# Patient Record
Sex: MALE | Race: WHITE
[De-identification: names, ages, dates, MRNs, and addresses within clinical notes are randomized per-mention and may not be internally consistent; named-entity substitution may affect disease eponyms.]

---

## 2023-03-22 ENCOUNTER — HOSPITAL ENCOUNTER (OUTPATIENT)
Dept: HOSPITAL 76 - SC | Age: 28
Discharge: HOME | End: 2023-03-22
Attending: NURSE PRACTITIONER
Payer: COMMERCIAL

## 2023-03-22 VITALS — SYSTOLIC BLOOD PRESSURE: 100 MMHG | DIASTOLIC BLOOD PRESSURE: 62 MMHG

## 2023-03-22 DIAGNOSIS — G47.8: ICD-10-CM

## 2023-03-22 DIAGNOSIS — Z87.891: ICD-10-CM

## 2023-03-22 DIAGNOSIS — R06.81: ICD-10-CM

## 2023-03-22 DIAGNOSIS — R06.83: Primary | ICD-10-CM

## 2023-03-22 DIAGNOSIS — E66.9: ICD-10-CM

## 2023-03-22 DIAGNOSIS — R53.83: ICD-10-CM

## 2023-03-22 PROCEDURE — 99203 OFFICE O/P NEW LOW 30 MIN: CPT

## 2023-03-22 PROCEDURE — 99212 OFFICE O/P EST SF 10 MIN: CPT

## 2023-03-22 NOTE — SLEEP CARE CONSULTATION
Information from patient questionnaire entered by Chapis Valenzuela.





I have reviewed and concur with the information entered by Chapis Valenzuela. This 

document represents the service I personally performed and the decisions made by

me, Ellen Contreras ARNP.





History of Present Illness


Service Date and Time: 2023    0845


Reason for Visit: New patient


Chief Complaint: reports: Unrefreshed sleep, Snoring, Observed pauses in 

breathing, Fatigue


Date of Onset: 4YRS


Usual bedtime: 11PM


Time it takes to fall asleep: 30-60MIN


Snores at night: Yes


Observed to quit breathing while asleep: Yes


Sleeps alone due to snoring: No


Number of times waking at night: 1-2


Reasons for waking at night: reports: Bathroom.  denies: Choking, Snoring, 

Gasping for air


Toss, Turn, or Twitch while sleeping: Yes


Recalls having dreams: No


Usually gets out of bed at: 0645; weekends 12 noon


Feels refreshed in the morning: No


Morning headache: No


Sleepy or fatigued during the day: Yes


Ever fallen asleep while driving: Yes (drowsy driving, no accidents)


Takes day naps: No (avoids naps)


Dreams during day naps: No


Prior sleep studies: No


Additional HPI information: 





I had the pleasure of seeing KE SHAW today regarding the possibility of him

having a sleep disorder. His current complaints are fatigue, observed pauses in 

breathing, snoring and unrefreshed sleep. He states for the few years he is 

having trouble falling asleep. He does not wake up feeling rested. His wife has 

seen him stop breathing at night and snoring. She is sleeping in same room but 

wears ear plugs. He has tried to use nasal snoring strips that does reduce 

snoring but not eliminate it. 





- Parasomnia Symptoms


Ever been unable to move upon waking from sleep: Yes (once every few months)


Walks in sleep: No


Talks in sleep: No


Ever acted out dreams in sleep: No


Ever felt weak in the knees when startled or emotional: No


Bothered by creepy, crawly, restless sensations in legs: Yes (leg "tap" when 

sitting)


Problems with memory or concentration: Yes (hard to focus on tasks for long 

periods)





Subjective


Initial [place] Sleepiness Scale score: 13 (23)





Past Medical History


Past Medical History: reports: Anxiety, Depression, Attention deficit





Social History


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 Patient is  and lives in [place]. 





Have you smoked in the past 12 months: No


Years of smokin


Quit date: 


Alcohol use: Yes


Alcohol amount and frequency: 1-2 GLASSES EVERY 3MONTHS


Caffeine use: No





Family History


Family history of sleep disordered breathing: Yes


Family Hx Sleep Apnea: Mother: Snoring, Sleep apnea - Treated, Father: Snoring, 

Sleep apnea - Treated, Grandparent: Snoring, Sleep apnea - Treated





Allergies and Home Medications


Known drug allergies: No


Drug allergies reviewed: Yes


Home medication list reviewed: Yes


Allergy and home medication list: 





Medications:


Bupropion





Review of Systems


Weight gain over past 5 years: 20-30


Cardiovascular: denies: high blood pressure


Respiratory: reports: sputum production, chronic cough


Gastrointestinal: reports: nausea, diarrhea.  denies: heartburn


Neurological: reports: headaches.  denies: seizure, head trauma


Psychiatric: reports: Attention Deficit Hyperactivity, anxiety, depression


Ear/Nose/Throat: reports: nasal congestion, wisdom teeth removed.  denies: 

injury to nose, tonsillectomy


Endocrine: denies: thyroid disease


Musculoskeletal: reports: back pain


Immunologic: reports: sneezing, allergies to food or environment (seasonal)





Physical Exam


Vital signs obtained and entered by: CHAPIS LAZAR MA


Blood Pressure: 100/62 (LEFT ARM)


Cuff size: regular


Heart Rate: 88


O2 Saturation: 96


Height: 5 ft 9 in


Weight: 208 lb 9.6 oz


Body Mass Index: 30.8


BMI Classification: Obese


Neck circumference: 18


Mouth and throat: narrow oropharynx


Hard palate: normal


Uvula: normal


Uvula visualization: 0% Mallampati Class IV


Tongue: normal in size


Tonsils: 1+


Neck: normal w/o lymphadenopathy or thyromegaly


Heart: regular rate and rhythm


Lungs: clear bilaterally





Impression and Plan





1. Suspected Obstructive Sleep Apnea-Hypopnea Syndrome, as suggested by a 

history of loud and irregular snoring, observed cessation of breath while 

asleep, unrefreshed sleep, cognitive impairment, and excessive daytime 

sleepiness. Narrow oropharynx and obesity are common predisposing factors for 

obstructive sleep apnea-hypopnea syndrome. I recommend proceeding to 

polysomnography to confirm the diagnosis and to assess severity. If the patient 

has significant sleep disordered breathing, a manual CPAP titration study will 

also be performed to find the optimal treatment pressure. I informed the patient

of what the sleep studies involve and after some discussion, obtained agreement 

to proceed. The pathophysiology of obstructive sleep apnea-hypopnea syndrome was

discussed with the patient and health risks of cardiovascular and 

cerebrovascular disease if not treated.  Risks of drowsy driving discussed in 

detail and patient advised to avoid long distance driving and to pull over at 

the first sign of drowsiness. Patient agreed to plan. 





* Schedule polysomnography


* Avoid long distance driving or driving when feeling sleepy.


* Avoid alcohol, sedative and muscle relaxant around bedtime.


* Attempt to lose weight.


* Review instructions provided by trained office staff on how to prepare for the

  sleep study.


* Return for follow-up after sleep study completed.





Counseling Topics: Weight loss health impact


Visit Type: In Office


Time Spent with Patient (minutes): 30


Provider Statement: I spent 100% of the Face to Face Visit with the patient with

greater than 50% spent counseling the patient and coordination of care.

## 2023-04-11 ENCOUNTER — HOSPITAL ENCOUNTER (OUTPATIENT)
Dept: HOSPITAL 76 - SC | Age: 28
Discharge: HOME | End: 2023-04-11
Attending: NURSE PRACTITIONER
Payer: COMMERCIAL

## 2023-04-11 DIAGNOSIS — G47.61: ICD-10-CM

## 2023-04-11 DIAGNOSIS — G47.33: Primary | ICD-10-CM

## 2023-04-11 DIAGNOSIS — E66.9: ICD-10-CM

## 2023-04-11 PROCEDURE — 95810 POLYSOM 6/> YRS 4/> PARAM: CPT

## 2023-04-19 ENCOUNTER — HOSPITAL ENCOUNTER (OUTPATIENT)
Dept: HOSPITAL 76 - SC | Age: 28
Discharge: HOME | End: 2023-04-19
Attending: NURSE PRACTITIONER
Payer: COMMERCIAL

## 2023-04-19 VITALS — DIASTOLIC BLOOD PRESSURE: 74 MMHG | SYSTOLIC BLOOD PRESSURE: 116 MMHG

## 2023-04-19 DIAGNOSIS — E66.9: ICD-10-CM

## 2023-04-19 DIAGNOSIS — G47.33: Primary | ICD-10-CM

## 2023-04-19 PROCEDURE — 99213 OFFICE O/P EST LOW 20 MIN: CPT

## 2023-04-19 PROCEDURE — 99212 OFFICE O/P EST SF 10 MIN: CPT

## 2023-07-12 ENCOUNTER — HOSPITAL ENCOUNTER (OUTPATIENT)
Dept: HOSPITAL 76 - SC | Age: 28
Discharge: HOME | End: 2023-07-12
Attending: NURSE PRACTITIONER
Payer: COMMERCIAL

## 2023-07-12 DIAGNOSIS — G47.33: Primary | ICD-10-CM

## 2023-07-12 DIAGNOSIS — E66.9: ICD-10-CM

## 2023-07-12 NOTE — SLEEP CARE CONSULTATION
Information from patient questionnaire entered by Chapis Valenzuela.





I have reviewed and concur with the information entered by Chapis Valenzuela. This 

document represents the service I personally performed and the decisions made by

me, Ellen Contreras ARNP.





History of Present Illness


Service Date and Time: 07/12/2023    1020


Previous diagnosis: Moderate, Obstructive Sleep Apnea-Hypopnea Syndrome


AHI: 25 (in 4/2023)


Reason for follow up: first compliance


Equipment type: CPAP (RESMED AS 11; s/u 4/2023)


Equipment obtained from: Other ([facility]; got initial supplies)


Mask style: Nasal


Mask brand: Respironics (Dreamwear)


Backup mask available: No (will keep old mask when replaced)


Last cushion change: 3-4 weeks


Prior sleep studies: No


Type of Sleep Study: Polysomnography (COMPLETED 04/11/23)


HPI additional information: 





KE SHAW was diagnosed to have moderate, AHI 25, obstructive sleep apnea-

hypopnea syndrome and returns via video telehealth visit today for CPAP therapy 

first compliance follow-up.





Sleep Study





- Results


Type of Sleep Study: Polysomnography (COMPLETED 04/11/23)


Prior sleep studies: No





CPAP Compliance Data





- Data Reviewed with Patient


Average duration of nightly device use: 8 HRS 2 MIN


Compliance rate %: 100 (06/11/23-07/10/23; 30/30 days used)


Current pressure setting (cmH2O): 4-15 (median 6.8, avg 9.7, max 11.2)


Average residual AHI: 2.3


Central apnea: 0.3


Obstructive apnea: 1.4


Average large leak: 0 L/min





Subjective


Patient concerns: reports: air blowing in eyes (doesn't bother him, usually 

mostly when watching TV with mask on), other (dry skin under the nose, using 

vaseline).  denies: aerophagia, mask discomfort, mask leak noise, condensation 

in mask/hose, nasal congestion, dry mouth, nose, throat, epistaxis


Observed to snore while using device: No


Current pressure setting perceived as: comfortable


On therapy, patient: reports: sleeping better, awakening more refreshed, being 

more awake and alert during the day, more rested overall.  denies: drowsiness 

while driving


Initial [place] Sleepiness Scale score: 13 (03/22/23)


Current [place] Sleepiness Scale score: 3 (07/12/23)





Allergies and Home Medications


Known drug allergies: No


Drug allergies reviewed: Yes


Home medication list reviewed: Yes (no changes)


Allergy and home medication list: 


Allergies





No Known Drug Allergies Allergy (Verified 07/11/23 13:15)








Review of Systems


Review of systems same as previous: Yes (no changes)





Physical Exam


Vital signs obtained and entered by: CHAPIS LAZAR MA


Height: 5 ft 9 in (PER PT)


Weight: 210 lb (PER PT)


Body Mass Index: 31.0


BMI Classification: Obese





Impression and Plan





1. Obstructive Sleep Apnea-Hypopnea Syndrome, moderate, with good treatment 

compliance and good apnea control. On CPAP therapy, the patient has better sleep

quality and is more rested overall. Patient has significant improvement of their

sleep apnea and is satisfied with current CPAP therapy.  He has been really 

liking the effects that he is feeling and states he is not sure how he slept 

before using the CPAP. The patients pressure will be changed to autoCPAP 7-11 

cmH20 to reflect pressure being used. Patient advised to contact me if pressure 

change is uncomfortable so that it can be adjusted. Goals for apnea control 

discussed. He has had a little bit of issue with dryness of his skin around the 

base of his nose.  He started using Vaseline to moisturize in the last few days 

and that has been improving the dryness.  I encouraged him to continue to 

moisturize skin and also to clean his mask cushion daily to reduce skin 

irritation.  He voiced understanding.  Patient's apnea severity and rationale 

for treatment to reduce apnea, improve sleep quality and reduce cardiovascular 

and cerebrovascular events was reviewed. I also reviewed the benefit of 

consistent device use of CPAP for depression, anxiety and attention deficit.





2. Obesity, unspecified. Currently patients BMI is 31.  Obesity increases the 

risk of apnea, CPAP pressure requirements and overall health risks especially 

cardiovascular and diabetes. Thus patient is advised to lose weight. 





* Change auto CPAP pressure to 7-11 cmH2O


* Notify me if snoring with mask or feeling that the pressure is too much or too

  little


* Attempt to lose weight


* Call this office if any problems using CPAP


* Return for follow up in 1-2 months, or sooner if concerns arise


* 





Counseling Topics: Spare mask, Weight loss health impact


Visit Type: Telehealth Video


Video Type: Doximity


Patient Location: Home


Location of Provider: Office


Patient agrees and consents to this telehealth visit type: Yes


Patient agrees to have their insurance billed: Yes


Time Spent with Patient (minutes): 11


Provider Statement: I spent 100% of the Telehealth Video Call with the patient 

with greater than 50% spent counseling the patient and coordination of care.